# Patient Record
Sex: MALE | Race: BLACK OR AFRICAN AMERICAN | NOT HISPANIC OR LATINO | URBAN - METROPOLITAN AREA
[De-identification: names, ages, dates, MRNs, and addresses within clinical notes are randomized per-mention and may not be internally consistent; named-entity substitution may affect disease eponyms.]

---

## 2019-02-22 ENCOUNTER — OFFICE VISIT (OUTPATIENT)
Dept: RHEUMATOLOGY | Facility: CLINIC | Age: 64
End: 2019-02-22
Payer: MEDICARE

## 2019-02-22 VITALS
SYSTOLIC BLOOD PRESSURE: 140 MMHG | WEIGHT: 305 LBS | BODY MASS INDEX: 40.42 KG/M2 | TEMPERATURE: 97 F | HEART RATE: 64 BPM | HEIGHT: 73 IN | DIASTOLIC BLOOD PRESSURE: 82 MMHG | RESPIRATION RATE: 16 BRPM

## 2019-02-22 DIAGNOSIS — M46.90 INFLAMMATORY SPONDYLOPATHY, UNSPECIFIED SPINAL REGION: Primary | ICD-10-CM

## 2019-02-22 DIAGNOSIS — S83.91XD: ICD-10-CM

## 2019-02-22 DIAGNOSIS — M75.111 INCOMPLETE TEAR OF RIGHT ROTATOR CUFF: ICD-10-CM

## 2019-02-22 DIAGNOSIS — M54.16 LUMBAR RADICULOPATHY: Primary | ICD-10-CM

## 2019-02-22 DIAGNOSIS — J44.9 CHRONIC OBSTRUCTIVE PULMONARY DISEASE, UNSPECIFIED COPD TYPE: ICD-10-CM

## 2019-02-22 DIAGNOSIS — N18.4 STAGE 4 CHRONIC KIDNEY DISEASE: ICD-10-CM

## 2019-02-22 DIAGNOSIS — M51.26 HERNIATED LUMBAR INTERVERTEBRAL DISC: ICD-10-CM

## 2019-02-22 PROBLEM — R80.9 PROTEINURIA: Status: ACTIVE | Noted: 2019-02-22

## 2019-02-22 PROBLEM — M75.110 INCOMPLETE TEAR OF ROTATOR CUFF: Status: ACTIVE | Noted: 2019-02-22

## 2019-02-22 PROBLEM — M79.7 FIBROMYOSITIS: Status: ACTIVE | Noted: 2018-04-23

## 2019-02-22 PROBLEM — G56.00 CARPAL TUNNEL SYNDROME: Status: ACTIVE | Noted: 2017-04-11

## 2019-02-22 PROBLEM — I82.409 DEEP VENOUS THROMBOSIS: Status: ACTIVE | Noted: 2019-02-22

## 2019-02-22 PROBLEM — R94.4 ABNORMAL CREATININE CLEARANCE GLOMERULAR FILTRATION: Status: ACTIVE | Noted: 2019-02-22

## 2019-02-22 PROBLEM — S83.90XA: Status: ACTIVE | Noted: 2019-02-22

## 2019-02-22 PROBLEM — M75.50 SUBACROMIAL BURSITIS: Status: ACTIVE | Noted: 2019-02-22

## 2019-02-22 PROCEDURE — 99214 PR OFFICE/OUTPT VISIT, EST, LEVL IV, 30-39 MIN: ICD-10-PCS | Mod: ,,, | Performed by: INTERNAL MEDICINE

## 2019-02-22 PROCEDURE — 99214 OFFICE O/P EST MOD 30 MIN: CPT | Mod: ,,, | Performed by: INTERNAL MEDICINE

## 2019-02-22 RX ORDER — FERROUS SULFATE 325(65) MG
TABLET, DELAYED RELEASE (ENTERIC COATED) ORAL
COMMUNITY

## 2019-02-22 RX ORDER — WARFARIN 2 MG/1
TABLET ORAL
Refills: 3 | COMMUNITY
Start: 2018-12-06

## 2019-02-22 RX ORDER — BECLOMETHASONE DIPROPIONATE HFA 80 UG/1
AEROSOL, METERED RESPIRATORY (INHALATION)
COMMUNITY
Start: 2018-12-07

## 2019-02-22 RX ORDER — ALLOPURINOL 100 MG/1
TABLET ORAL
COMMUNITY
Start: 2019-01-11 | End: 2019-05-22 | Stop reason: SDUPTHER

## 2019-02-22 RX ORDER — FOLIC ACID 1 MG/1
TABLET ORAL
Refills: 3 | COMMUNITY
Start: 2018-12-07

## 2019-02-22 RX ORDER — LISINOPRIL 10 MG/1
TABLET ORAL
COMMUNITY
Start: 2019-01-11

## 2019-02-22 RX ORDER — MONTELUKAST SODIUM 10 MG/1
10 TABLET ORAL NIGHTLY
Refills: 3 | COMMUNITY
Start: 2018-12-26

## 2019-02-22 RX ORDER — TAMSULOSIN HYDROCHLORIDE 0.4 MG/1
CAPSULE ORAL
COMMUNITY
Start: 2019-01-11

## 2019-02-22 RX ORDER — HYDROCODONE BITARTRATE AND ACETAMINOPHEN 7.5; 325 MG/1; MG/1
TABLET ORAL
Refills: 0 | COMMUNITY
Start: 2019-01-14 | End: 2019-04-04 | Stop reason: SDUPTHER

## 2019-02-22 RX ORDER — HYDROCODONE BITARTRATE AND ACETAMINOPHEN 7.5; 325 MG/1; MG/1
1 TABLET ORAL 3 TIMES DAILY PRN
Qty: 90 TABLET | Refills: 0 | Status: SHIPPED | OUTPATIENT
Start: 2019-04-24 | End: 2019-05-22 | Stop reason: SDUPTHER

## 2019-02-22 RX ORDER — PREDNISONE 10 MG/1
TABLET ORAL
COMMUNITY
End: 2019-02-22 | Stop reason: SDUPTHER

## 2019-02-22 RX ORDER — WARFARIN SODIUM 5 MG/1
TABLET ORAL
COMMUNITY

## 2019-02-22 RX ORDER — CLONAZEPAM 0.5 MG/1
0.5 TABLET ORAL DAILY
Refills: 3 | COMMUNITY
Start: 2019-01-23

## 2019-02-22 RX ORDER — OMEPRAZOLE AND SODIUM BICARBONATE 40; 1100 MG/1; MG/1
CAPSULE ORAL
COMMUNITY
Start: 2018-12-27

## 2019-02-22 RX ORDER — PREDNISONE 10 MG/1
TABLET ORAL
Qty: 30 TABLET | Refills: 3 | Status: SHIPPED | OUTPATIENT
Start: 2019-02-22 | End: 2019-05-22 | Stop reason: SDUPTHER

## 2019-02-22 RX ORDER — CYCLOBENZAPRINE HCL 10 MG
TABLET ORAL
COMMUNITY
End: 2020-05-27 | Stop reason: SDUPTHER

## 2019-02-22 RX ORDER — SILDENAFIL 100 MG/1
100 TABLET, FILM COATED ORAL DAILY
Refills: 11 | COMMUNITY
Start: 2019-02-13

## 2019-02-22 RX ORDER — DICLOFENAC SODIUM 10 MG/G
GEL TOPICAL
Qty: 1 TUBE | Refills: 3 | Status: SHIPPED | OUTPATIENT
Start: 2019-02-22 | End: 2019-07-08 | Stop reason: SDUPTHER

## 2019-02-22 RX ORDER — CALCITRIOL 0.5 UG/1
0.5 CAPSULE ORAL DAILY
Refills: 3 | COMMUNITY
Start: 2018-12-22

## 2019-02-22 RX ORDER — GABAPENTIN 300 MG/1
CAPSULE ORAL
COMMUNITY
Start: 2019-01-14

## 2019-02-22 RX ORDER — DICLOFENAC SODIUM 10 MG/G
GEL TOPICAL
COMMUNITY
End: 2019-02-22 | Stop reason: SDUPTHER

## 2019-02-22 RX ORDER — FUROSEMIDE 40 MG/1
40 TABLET ORAL DAILY
Refills: 3 | COMMUNITY
Start: 2019-01-17

## 2019-02-22 RX ORDER — ALPRAZOLAM 0.5 MG/1
TABLET ORAL
COMMUNITY

## 2019-02-22 RX ORDER — LABETALOL 100 MG/1
TABLET, FILM COATED ORAL
COMMUNITY
Start: 2019-01-14

## 2019-02-22 RX ORDER — GLUCOSAM/CHONDRO/HERB 149/HYAL 750-100 MG
TABLET ORAL
COMMUNITY

## 2019-02-22 NOTE — PROGRESS NOTES
Subjective:       Patient ID: Panfilo Valadez is a 63 y.o. male.    Chief Complaint: Follow-up    HPI Main comlain  Is rt shoulder for Rotator cuff tear and is planning to have arthroscopic surgery soon. Has daily lower back sacroiliitis pain withn oocasional sciatica pain On Gabapentin 300 three tabs a day. . No acutely swollen  Tender painful joint  Except left wrist with achiness, Vontinue on allopurinol 300 mg and and nephrologist told him that kidney function is stable .         Current medications include:  Current Outpatient Medications on File Prior to Visit   Medication Sig Dispense Refill    allopurinol (ZYLOPRIM) 100 MG tablet       ALPRAZolam (XANAX) 0.5 MG tablet alprazolam 0.5 mg tablet      calcitRIOL (ROCALTROL) 0.5 MCG Cap Take 0.5 mcg by mouth once daily.  3    clonazePAM (KLONOPIN) 0.5 MG tablet Take 0.5 mg by mouth once daily.  3    cyclobenzaprine (FLEXERIL) 10 MG tablet cyclobenzaprine 10 mg tablet   TAKE 1 TABLET BY MOUTH 3 TIMES A DAY AS NEEDED FOR MUSCLE SPASMS      diclofenac sodium (VOLTAREN) 1 % Gel diclofenac 1 % topical gel      ferrous sulfate 325 (65 FE) MG EC tablet ferrous sulfate 325 mg (65 mg iron) tablet,delayed release   TAKE 1 TABLET BY MOUTH TWICE A DAY      folic acid (FOLVITE) 1 MG tablet TAKE 5 TABLETS ONE TIME DAILY  3    furosemide (LASIX) 40 MG tablet Take 40 mg by mouth once daily.  3    gabapentin (NEURONTIN) 300 MG capsule       HYDROcodone-acetaminophen (NORCO) 7.5-325 mg per tablet TAKE 1 TABLET BY MOUTH 2-3 TIMES DAILY FOR SEVERE PAIN ONLY.  0    labetalol (NORMODYNE) 100 MG tablet       lisinopril 10 MG tablet       montelukast (SINGULAIR) 10 mg tablet Take 10 mg by mouth every evening.  3    omega 3-dha-epa-fish oil (FISH OIL) 1,000 mg (120 mg-180 mg) Cap Fish Oil 1,000 mg (120 mg-180 mg) capsule   Take 2 capsules every day by oral route.      omeprazole-sodium bicarbonate (ZEGERID) 40-1.1 mg-gram per capsule       predniSONE (DELTASONE) 10 MG  "tablet prednisone 10 mg tablet   TAKE 1 TAB THREE TIMES DAILY FOR 3 DAYS, 1 TWICE DAILY X 2 DAYS, THEN 1 TAB DAILY FOR 2 DAYS.      QVAR REDIHALER 80 mcg/actuation HFAB       sildenafil (VIAGRA) 100 MG tablet Take 100 mg by mouth once daily.  11    tamsulosin (FLOMAX) 0.4 mg Cap       warfarin (COUMADIN) 2 MG tablet TAKE 1 TABLET BY MOUTH ONCE A DAY TAKE WITH COUMADIN 5 MG/DAY.  3    warfarin (COUMADIN) 5 MG tablet warfarin 5 mg tablet       No current facility-administered medications on file prior to visit.        Lab Results:  No results found for: WBC, RBC, HGB, HCT, MCV, COLORU, SPECGRAV, PHUR, WBCUR, NITRITE, GLUCOSEUR, KETONESU, BILIRUBINUR, RBCUR, NA, K, CL, CO2, GLU, BUN, CREATININE     Review of Systems   HENT:        Dryness in eyes helped by Systane at night   Respiratory: Positive for wheezing.         Asthma   Musculoskeletal: Positive for back pain and neck pain.   Neurological: Positive for numbness.         Objective:   BP (!) 140/82 (BP Location: Right arm, Patient Position: Sitting, BP Method: Large (Manual))   Pulse 64   Temp 97.3 °F (36.3 °C) (Temporal)   Resp 16   Ht 6' 1" (1.854 m)   Wt (!) 138.3 kg (305 lb)   BMI 40.24 kg/m²      Physical Exam   Constitutional: He is well-developed, well-nourished, and in no distress.   HENT:   Dryness in eyes   Pulmonary/Chest: He has wheezes.       Right Side Rheumatological Exam     The patient is tender to palpation of the shoulder and knee    He has swelling of the knee    Shoulder Exam   Tenderness Location: subacromion    Range of Motion   Active abduction: abnormal     Knee Exam   Tenderness Location: patella tendon    Range of Motion   Extension: abnormal     Muscle Strength (0-5 scale):  Neck Flexion:  4    Left Side Rheumatological Exam     The patient is tender to palpation of the wrist.    Muscle Strength (0-5 scale):  Neck Flexion:  4      Neurological:   Occasional sciatica         Assessment:       1. Inflammatory spondylopathy, " unspecified spinal region    2. Rupture of ligament of knee joint, right, subsequent encounter    3. Incomplete tear of right rotator cuff    4. Stage 4 chronic kidney disease    5. Chronic obstructive pulmonary disease, unspecified COPD type    6. Herniated lumbar intervertebral disc            Plan:         CONTINUE CURRENT MEDICINES AND DOSIS. uSE aSPERCREQAM AND LIDOCAINE FOR LEFT WRIST TENDONITIS

## 2019-04-04 ENCOUNTER — TELEPHONE (OUTPATIENT)
Dept: RHEUMATOLOGY | Facility: CLINIC | Age: 64
End: 2019-04-04

## 2019-04-04 DIAGNOSIS — M54.17 LUMBOSACRAL RADICULOPATHY: Primary | ICD-10-CM

## 2019-04-04 RX ORDER — HYDROCODONE BITARTRATE AND ACETAMINOPHEN 7.5; 325 MG/1; MG/1
1 TABLET ORAL EVERY 8 HOURS PRN
Qty: 90 TABLET | Refills: 0 | Status: SHIPPED | OUTPATIENT
Start: 2019-04-04 | End: 2019-05-22 | Stop reason: SDUPTHER

## 2019-05-15 DIAGNOSIS — Z79.899 ENCOUNTER FOR LONG-TERM (CURRENT) USE OF MEDICATIONS: Primary | ICD-10-CM

## 2019-05-17 LAB
ABS NRBC COUNT: 0 X 10 3/UL (ref 0–0.01)
ABSOLUTE BASOPHIL: 0.07 X 10 3/UL (ref 0–0.22)
ABSOLUTE EOSINOPHIL: 0.05 X 10 3/UL (ref 0.04–0.54)
ABSOLUTE IMMATURE GRAN: 0.02 X 10 3/UL (ref 0–0.04)
ABSOLUTE LYMPHOCYTE: 0.97 X 10 3/UL (ref 0.86–4.75)
ABSOLUTE MONOCYTE: 0.8 X 10 3/UL (ref 0.22–1.08)
ALBUMIN SERPL-MCNC: 3.8 G/DL (ref 3.5–5.2)
ALBUMIN/GLOB SERPL ELPH: 1.2 {RATIO} (ref 1–2.7)
ALP ISOS SERPL LEV INH-CCNC: 71 IU/L (ref 40–130)
ALT (SGPT): 17 U/L (ref 0–41)
AMORPH URATE CRY URNS QL MICRO: NEGATIVE
ANION GAP SERPL CALC-SCNC: 9 MMOL/L (ref 8–17)
AST SERPL-CCNC: 35 U/L (ref 0–40)
BACTERIA #/AREA URNS HPF: NEGATIVE /[HPF]
BASOPHILS NFR BLD: 0.9 %
BILIRUB UR QL STRIP: NEGATIVE
BILIRUBIN, TOTAL: 0.29 MG/DL (ref 0–1.2)
BUN/CREAT SERPL: 10.4 (ref 6–20)
CALCIUM SERPL-MCNC: 10.2 MG/DL (ref 8.6–10.2)
CARBON DIOXIDE, CO2: 24 MMOL/L (ref 22–29)
CHLORIDE: 106 MMOL/L (ref 98–107)
CLARITY UR: CLEAR
COLOR UR: YELLOW
CREAT SERPL-MCNC: 2.71 MG/DL (ref 0.7–1.2)
EOSINOPHIL NFR BLD: 0.6 %
EPITHELIAL CELLS: ABNORMAL
GFR ESTIMATION: 23.89
GLOBULIN: 3.2 G/DL (ref 1.5–4.5)
GLUCOSE (UA): NEGATIVE MG/DL
GLUCOSE: 114 MG/DL (ref 82–115)
HCT VFR BLD AUTO: 50.2 % (ref 42–52)
HGB BLD-MCNC: 15.8 G/DL (ref 14–18)
IMMATURE GRANULOCYTES: 0.3 % (ref 0–0.5)
KETONES UR QL STRIP: NEGATIVE MG/DL
LEUKOCYTE ESTERASE UR QL STRIP: NEGATIVE
LYMPHOCYTES NFR BLD: 12.4 %
MCH RBC QN AUTO: 28.7 PG (ref 27–32)
MCHC RBC AUTO-ENTMCNC: 31.5 G/DL (ref 32–36)
MCV RBC AUTO: 91.1 FL (ref 80–94)
MONOCYTES NFR BLD: 10.2 %
MUCOUS THREADS URNS QL MICRO: ABNORMAL
NEUTROPHILS ABSOLUTE COUNT: 5.94 X 10 3/UL (ref 2.15–7.56)
NEUTROPHILS NFR BLD: 75.6 %
NITRITE UR QL STRIP: NEGATIVE
NUCLEATED RED BLOOD CELLS: 0 /100 WBC (ref 0–0.2)
OCCULT BLOOD: NEGATIVE
PH, URINE: 6.5 (ref 5–7.5)
PLATELET # BLD AUTO: 223 X 10 3/UL (ref 135–400)
POTASSIUM: 5 MMOL/L (ref 3.5–5.1)
PROT SNV-MCNC: 7 G/DL (ref 6.4–8.3)
PROT UR QL STRIP: 30 MG/DL
RBC # BLD AUTO: 5.51 X 10 6/UL (ref 4.7–6.1)
RBC/HPF: NEGATIVE
RDW-SD: 50.3 FL (ref 37–54)
SED RATE (WESTERGREN): 28 MM/HR (ref 0–20)
SODIUM: 139 MMOL/L (ref 136–145)
SP GR UR STRIP: 1.01 (ref 1–1.03)
UREA NITROGEN (BUN): 28.1 MG/DL (ref 8–23)
UROBILINOGEN, URINE: 1 E.U./DL (ref 0–1)
WBC # BLD: 7.85 X 10 3/UL (ref 4.3–10.8)
WBC/HPF: NEGATIVE

## 2019-05-22 ENCOUNTER — OFFICE VISIT (OUTPATIENT)
Dept: RHEUMATOLOGY | Facility: CLINIC | Age: 64
End: 2019-05-22
Payer: MEDICARE

## 2019-05-22 VITALS
DIASTOLIC BLOOD PRESSURE: 80 MMHG | SYSTOLIC BLOOD PRESSURE: 120 MMHG | TEMPERATURE: 97 F | HEIGHT: 74 IN | WEIGHT: 302 LBS | RESPIRATION RATE: 16 BRPM | BODY MASS INDEX: 38.76 KG/M2 | HEART RATE: 84 BPM

## 2019-05-22 DIAGNOSIS — S83.91XD: ICD-10-CM

## 2019-05-22 DIAGNOSIS — M54.16 LUMBAR RADICULOPATHY: ICD-10-CM

## 2019-05-22 DIAGNOSIS — M46.90 INFLAMMATORY SPONDYLOPATHY, UNSPECIFIED SPINAL REGION: ICD-10-CM

## 2019-05-22 DIAGNOSIS — E79.0 HYPERURICEMIA: Primary | ICD-10-CM

## 2019-05-22 DIAGNOSIS — N18.4 STAGE 4 CHRONIC KIDNEY DISEASE: ICD-10-CM

## 2019-05-22 DIAGNOSIS — M54.30 SCIATICA, UNSPECIFIED LATERALITY: ICD-10-CM

## 2019-05-22 DIAGNOSIS — R94.4 ABNORMAL CREATININE CLEARANCE GLOMERULAR FILTRATION: ICD-10-CM

## 2019-05-22 DIAGNOSIS — M75.50 SUBACROMIAL BURSITIS: ICD-10-CM

## 2019-05-22 DIAGNOSIS — M10.9 GOUT, UNSPECIFIED CAUSE, UNSPECIFIED CHRONICITY, UNSPECIFIED SITE: ICD-10-CM

## 2019-05-22 DIAGNOSIS — J44.9 CHRONIC OBSTRUCTIVE PULMONARY DISEASE, UNSPECIFIED COPD TYPE: ICD-10-CM

## 2019-05-22 DIAGNOSIS — M79.7 FIBROMYOSITIS: ICD-10-CM

## 2019-05-22 PROBLEM — M12.811 ROTATOR CUFF TEAR ARTHROPATHY OF RIGHT SHOULDER: Status: ACTIVE | Noted: 2019-05-22

## 2019-05-22 PROBLEM — M70.60 TROCHANTERIC BURSITIS: Status: ACTIVE | Noted: 2019-05-22

## 2019-05-22 PROBLEM — D84.821 IMMUNODEFICIENCY DUE TO LONG TERM DRUG THERAPY: Status: ACTIVE | Noted: 2019-05-22

## 2019-05-22 PROBLEM — I10 HYPERTENSIVE DISORDER: Status: ACTIVE | Noted: 2019-05-22

## 2019-05-22 PROBLEM — M79.2 NEURALGIA: Status: ACTIVE | Noted: 2019-05-22

## 2019-05-22 PROBLEM — M75.101 ROTATOR CUFF TEAR ARTHROPATHY OF RIGHT SHOULDER: Status: ACTIVE | Noted: 2019-05-22

## 2019-05-22 PROBLEM — E50.7 XEROPHTHALMIA: Status: ACTIVE | Noted: 2019-05-22

## 2019-05-22 PROBLEM — Z79.899 IMMUNODEFICIENCY DUE TO LONG TERM DRUG THERAPY: Status: ACTIVE | Noted: 2019-05-22

## 2019-05-22 PROCEDURE — 99214 OFFICE O/P EST MOD 30 MIN: CPT | Mod: S$GLB,,, | Performed by: INTERNAL MEDICINE

## 2019-05-22 PROCEDURE — 99214 PR OFFICE/OUTPT VISIT, EST, LEVL IV, 30-39 MIN: ICD-10-PCS | Mod: S$GLB,,, | Performed by: INTERNAL MEDICINE

## 2019-05-22 RX ORDER — PREDNISONE 10 MG/1
TABLET ORAL
Qty: 30 TABLET | Refills: 3 | Status: SHIPPED | OUTPATIENT
Start: 2019-05-22 | End: 2019-10-30 | Stop reason: SDUPTHER

## 2019-05-22 RX ORDER — ALLOPURINOL 100 MG/1
100 TABLET ORAL 3 TIMES DAILY PRN
Qty: 90 TABLET | Refills: 4 | Status: SHIPPED | OUTPATIENT
Start: 2019-05-22 | End: 2019-06-01 | Stop reason: SDUPTHER

## 2019-05-22 RX ORDER — HYDROCODONE BITARTRATE AND ACETAMINOPHEN 7.5; 325 MG/1; MG/1
1 TABLET ORAL 3 TIMES DAILY PRN
Qty: 90 TABLET | Refills: 0 | Status: SHIPPED | OUTPATIENT
Start: 2019-05-22 | End: 2019-06-20

## 2019-05-22 NOTE — PROGRESS NOTES
Subjective:       Patient ID: Panfilo Valadez is a 63 y.o. male.    Chief Complaint: Follow-up (with labs)    HPI continue on allopurinol with no acut gouty arthritis, Left knee arthroplasty for quadricep tear repain doing well with use of a brace.Left wrist with aching on daily basis. Continue Gabapentin 200 mg BID. No recurrencem of blood clot on Coumadinn 67 mg po . Shoulder hurt more on the rt fro rotatootvsddf.     Current medications include:  Current Outpatient Medications on File Prior to Visit   Medication Sig Dispense Refill    ALPRAZolam (XANAX) 0.5 MG tablet alprazolam 0.5 mg tablet      calcitRIOL (ROCALTROL) 0.5 MCG Cap Take 0.5 mcg by mouth once daily.  3    clonazePAM (KLONOPIN) 0.5 MG tablet Take 0.5 mg by mouth once daily.  3    cyclobenzaprine (FLEXERIL) 10 MG tablet cyclobenzaprine 10 mg tablet   TAKE 1 TABLET BY MOUTH 3 TIMES A DAY AS NEEDED FOR MUSCLE SPASMS      diclofenac sodium (VOLTAREN) 1 % Gel Apply 2 gm on painful joint area QID PRN 1 Tube 3    ferrous sulfate 325 (65 FE) MG EC tablet ferrous sulfate 325 mg (65 mg iron) tablet,delayed release   TAKE 1 TABLET BY MOUTH TWICE A DAY      folic acid (FOLVITE) 1 MG tablet TAKE 5 TABLETS ONE TIME DAILY  3    furosemide (LASIX) 40 MG tablet Take 40 mg by mouth once daily.  3    gabapentin (NEURONTIN) 300 MG capsule       labetalol (NORMODYNE) 100 MG tablet       lisinopril 10 MG tablet       montelukast (SINGULAIR) 10 mg tablet Take 10 mg by mouth every evening.  3    omega 3-dha-epa-fish oil (FISH OIL) 1,000 mg (120 mg-180 mg) Cap Fish Oil 1,000 mg (120 mg-180 mg) capsule   Take 2 capsules every day by oral route.      omeprazole-sodium bicarbonate (ZEGERID) 40-1.1 mg-gram per capsule       QVAR REDIHALER 80 mcg/actuation HFAB       sildenafil (VIAGRA) 100 MG tablet Take 100 mg by mouth once daily.  11    tamsulosin (FLOMAX) 0.4 mg Cap       warfarin (COUMADIN) 2 MG tablet TAKE 1 TABLET BY MOUTH ONCE A DAY TAKE WITH COUMADIN 5  "MG/DAY.  3    warfarin (COUMADIN) 5 MG tablet warfarin 5 mg tablet      [DISCONTINUED] allopurinol (ZYLOPRIM) 100 MG tablet       [DISCONTINUED] HYDROcodone-acetaminophen (NORCO) 7.5-325 mg per tablet Take 1 tablet by mouth 3 (three) times daily as needed for Pain. 90 tablet 0    [DISCONTINUED] HYDROcodone-acetaminophen (NORCO) 7.5-325 mg per tablet Take 1 tablet by mouth every 8 (eight) hours as needed for Pain. 90 tablet 0    [DISCONTINUED] predniSONE (DELTASONE) 10 MG tablet prednisone 10 mg tablet  TAKE 1 TAB THREE TIMES DAILY FOR 3 DAYS, 1 TWICE DAILY X 2 DAYS, THEN 1 TAB DAILY FOR 2 DAYS. 30 tablet 3     No current facility-administered medications on file prior to visit.        Lab Results:  WBC   Date Value Ref Range Status   05/15/2019 7.85 4.3 - 10.8 X 10 3/ul Final     Hemoglobin   Date Value Ref Range Status   05/15/2019 15.8 14 - 18 g/dL Final     Hematocrit   Date Value Ref Range Status   05/15/2019 50.2 42 - 52 % Final     Color, UA   Date Value Ref Range Status   05/15/2019 YELLOW  Final     Ketones, UA   Date Value Ref Range Status   05/15/2019 NEGATIVE NEGATIVE mg/dL Final     Sodium   Date Value Ref Range Status   05/15/2019 139 136 - 145 mmol/L Final     Potassium   Date Value Ref Range Status   05/15/2019 5.0 3.5 - 5.1 mmol/L Final     Chloride   Date Value Ref Range Status   05/15/2019 106 98 - 107 mmol/L Final     CO2   Date Value Ref Range Status   05/15/2019 24 22 - 29 mmol/L Final     BUN, Bld   Date Value Ref Range Status   05/15/2019 28.1 (H) 8 - 23 mg/dL Final     Creatinine   Date Value Ref Range Status   05/15/2019 2.71 (H) 0.70 - 1.20 mg/dL Final     Comment:     RECOMMEND REPEAT CREATININE WITHIN 90 DAYS        Review of Systems      Objective:   /80 (BP Location: Left arm, Patient Position: Sitting, BP Method: Large (Manual))   Pulse 84   Temp 96.9 °F (36.1 °C) (Temporal)   Resp 16   Ht 6' 2" (1.88 m)   Wt (!) 137 kg (302 lb)   BMI 38.77 kg/m²      Physical Exam    "   Assessment:       1. Gout, unspecified cause, unspecified chronicity, unspecified site    2. Lumbar radiculopathy    3. Inflammatory spondylopathy, unspecified spinal region    4. Fibromyositis    5. Rupture of ligament of knee joint, right, subsequent encounter    6. Subacromial bursitis    7. Hyperuricemia    8. Sciatica, unspecified laterality    9. Stage 4 chronic kidney disease    10. Abnormal creatinine clearance glomerular filtration    11. Chronic obstructive pulmonary disease, unspecified COPD type            Plan:       comntinue current medicaions nd dosis

## 2019-05-22 NOTE — PROGRESS NOTES
Subjective:       Patient ID: Panfilo Valadez is a 63 y.o. male.    Chief Complaint: Follow-up (with labs)    HPI Already done        Current medications include:  Current Outpatient Medications on File Prior to Visit   Medication Sig Dispense Refill    ALPRAZolam (XANAX) 0.5 MG tablet alprazolam 0.5 mg tablet      calcitRIOL (ROCALTROL) 0.5 MCG Cap Take 0.5 mcg by mouth once daily.  3    clonazePAM (KLONOPIN) 0.5 MG tablet Take 0.5 mg by mouth once daily.  3    cyclobenzaprine (FLEXERIL) 10 MG tablet cyclobenzaprine 10 mg tablet   TAKE 1 TABLET BY MOUTH 3 TIMES A DAY AS NEEDED FOR MUSCLE SPASMS      diclofenac sodium (VOLTAREN) 1 % Gel Apply 2 gm on painful joint area QID PRN 1 Tube 3    ferrous sulfate 325 (65 FE) MG EC tablet ferrous sulfate 325 mg (65 mg iron) tablet,delayed release   TAKE 1 TABLET BY MOUTH TWICE A DAY      folic acid (FOLVITE) 1 MG tablet TAKE 5 TABLETS ONE TIME DAILY  3    furosemide (LASIX) 40 MG tablet Take 40 mg by mouth once daily.  3    gabapentin (NEURONTIN) 300 MG capsule       labetalol (NORMODYNE) 100 MG tablet       lisinopril 10 MG tablet       montelukast (SINGULAIR) 10 mg tablet Take 10 mg by mouth every evening.  3    omega 3-dha-epa-fish oil (FISH OIL) 1,000 mg (120 mg-180 mg) Cap Fish Oil 1,000 mg (120 mg-180 mg) capsule   Take 2 capsules every day by oral route.      omeprazole-sodium bicarbonate (ZEGERID) 40-1.1 mg-gram per capsule       QVAR REDIHALER 80 mcg/actuation HFAB       sildenafil (VIAGRA) 100 MG tablet Take 100 mg by mouth once daily.  11    tamsulosin (FLOMAX) 0.4 mg Cap       warfarin (COUMADIN) 2 MG tablet TAKE 1 TABLET BY MOUTH ONCE A DAY TAKE WITH COUMADIN 5 MG/DAY.  3    warfarin (COUMADIN) 5 MG tablet warfarin 5 mg tablet      [DISCONTINUED] allopurinol (ZYLOPRIM) 100 MG tablet       [DISCONTINUED] HYDROcodone-acetaminophen (NORCO) 7.5-325 mg per tablet Take 1 tablet by mouth 3 (three) times daily as needed for Pain. 90 tablet 0     [DISCONTINUED] HYDROcodone-acetaminophen (NORCO) 7.5-325 mg per tablet Take 1 tablet by mouth every 8 (eight) hours as needed for Pain. 90 tablet 0    [DISCONTINUED] predniSONE (DELTASONE) 10 MG tablet prednisone 10 mg tablet  TAKE 1 TAB THREE TIMES DAILY FOR 3 DAYS, 1 TWICE DAILY X 2 DAYS, THEN 1 TAB DAILY FOR 2 DAYS. 30 tablet 3     No current facility-administered medications on file prior to visit.        Lab Results:  WBC   Date Value Ref Range Status   05/15/2019 7.85 4.3 - 10.8 X 10 3/ul Final     Hemoglobin   Date Value Ref Range Status   05/15/2019 15.8 14 - 18 g/dL Final     Hematocrit   Date Value Ref Range Status   05/15/2019 50.2 42 - 52 % Final     Color, UA   Date Value Ref Range Status   05/15/2019 YELLOW  Final     Ketones, UA   Date Value Ref Range Status   05/15/2019 NEGATIVE NEGATIVE mg/dL Final     Sodium   Date Value Ref Range Status   05/15/2019 139 136 - 145 mmol/L Final     Potassium   Date Value Ref Range Status   05/15/2019 5.0 3.5 - 5.1 mmol/L Final     Chloride   Date Value Ref Range Status   05/15/2019 106 98 - 107 mmol/L Final     CO2   Date Value Ref Range Status   05/15/2019 24 22 - 29 mmol/L Final     BUN, Bld   Date Value Ref Range Status   05/15/2019 28.1 (H) 8 - 23 mg/dL Final     Creatinine   Date Value Ref Range Status   05/15/2019 2.71 (H) 0.70 - 1.20 mg/dL Final     Comment:     RECOMMEND REPEAT CREATININE WITHIN 90 DAYS        Review of Systems   Constitutional: Negative.    HENT:        Eyes drynes on systane   Eyes: Negative.    Respiratory:        Asthma on oral inhaler   Cardiovascular: Negative.    Gastrointestinal: Negative.    Endocrine: Negative.    Genitourinary: Negative.    Musculoskeletal: Positive for arthralgias and back pain.   Allergic/Immunologic: Positive for environmental allergies.   Neurological:        Left foot numb   Hematological: Negative.    Psychiatric/Behavioral: Negative.          Objective:   /80 (BP Location: Left arm, Patient Position:  "Sitting, BP Method: Large (Manual))   Pulse 84   Temp 96.9 °F (36.1 °C) (Temporal)   Resp 16   Ht 6' 2" (1.88 m)   Wt (!) 137 kg (302 lb)   BMI 38.77 kg/m²      Physical Exam   Constitutional: He is well-developed, well-nourished, and in no distress.   HENT:   Dry eyes   Eyes: Conjunctivae and EOM are normal. Pupils are equal, round, and reactive to light.   Neck: Normal range of motion. Neck supple.   Cardiovascular: Normal rate, regular rhythm and normal heart sounds.    Pulmonary/Chest: Effort normal and breath sounds normal.   Abdominal: Bowel sounds are normal.   Neurological:   SLR= negative   Skin: Skin is warm and dry.     Musculoskeletal:   Lower  SI tenderness           Assessment:       1. Hyperuricemia    2. Gout, unspecified cause, unspecified chronicity, unspecified site    3. Lumbar radiculopathy    4. Inflammatory spondylopathy, unspecified spinal region    5. Fibromyositis    6. Rupture of ligament of knee joint, right, subsequent encounter    7. Subacromial bursitis    8. Sciatica, unspecified laterality    9. Stage 4 chronic kidney disease    10. Abnormal creatinine clearance glomerular filtration    11. Chronic obstructive pulmonary disease, unspecified COPD type            Plan:        continue sme medications and dosis - see previous mnote     "

## 2019-06-01 DIAGNOSIS — M10.9 GOUT, UNSPECIFIED CAUSE, UNSPECIFIED CHRONICITY, UNSPECIFIED SITE: ICD-10-CM

## 2019-06-03 RX ORDER — ALLOPURINOL 100 MG/1
TABLET ORAL
Qty: 270 TABLET | Refills: 3 | Status: SHIPPED | OUTPATIENT
Start: 2019-06-03

## 2019-06-27 ENCOUNTER — TELEPHONE (OUTPATIENT)
Dept: RHEUMATOLOGY | Facility: CLINIC | Age: 64
End: 2019-06-27

## 2019-06-27 DIAGNOSIS — M54.17 LUMBOSACRAL RADICULOPATHY: Primary | ICD-10-CM

## 2019-06-27 RX ORDER — HYDROCODONE BITARTRATE AND ACETAMINOPHEN 7.5; 325 MG/1; MG/1
1 TABLET ORAL 3 TIMES DAILY PRN
Qty: 90 TABLET | Refills: 0 | Status: SHIPPED | OUTPATIENT
Start: 2019-06-27 | End: 2019-07-31 | Stop reason: SDUPTHER

## 2019-07-09 RX ORDER — DICLOFENAC SODIUM 10 MG/G
GEL TOPICAL
Qty: 100 G | Refills: 4 | Status: SHIPPED | OUTPATIENT
Start: 2019-07-09 | End: 2019-08-22 | Stop reason: SDUPTHER

## 2019-07-31 ENCOUNTER — TELEPHONE (OUTPATIENT)
Dept: RHEUMATOLOGY | Facility: CLINIC | Age: 64
End: 2019-07-31

## 2019-07-31 DIAGNOSIS — M54.17 LUMBOSACRAL RADICULOPATHY: ICD-10-CM

## 2019-07-31 RX ORDER — HYDROCODONE BITARTRATE AND ACETAMINOPHEN 7.5; 325 MG/1; MG/1
1 TABLET ORAL 3 TIMES DAILY PRN
Qty: 90 TABLET | Refills: 0 | Status: SHIPPED | OUTPATIENT
Start: 2019-07-31 | End: 2019-09-25 | Stop reason: SDUPTHER

## 2019-08-22 ENCOUNTER — OFFICE VISIT (OUTPATIENT)
Dept: RHEUMATOLOGY | Facility: CLINIC | Age: 64
End: 2019-08-22
Payer: MEDICARE

## 2019-08-22 VITALS
HEART RATE: 95 BPM | SYSTOLIC BLOOD PRESSURE: 120 MMHG | HEIGHT: 74 IN | WEIGHT: 310 LBS | TEMPERATURE: 97 F | DIASTOLIC BLOOD PRESSURE: 80 MMHG | BODY MASS INDEX: 39.78 KG/M2 | RESPIRATION RATE: 16 BRPM

## 2019-08-22 DIAGNOSIS — R94.4 ABNORMAL CREATININE CLEARANCE GLOMERULAR FILTRATION: Primary | ICD-10-CM

## 2019-08-22 DIAGNOSIS — S83.91XD: ICD-10-CM

## 2019-08-22 DIAGNOSIS — M46.90 INFLAMMATORY SPONDYLOPATHY, UNSPECIFIED SPINAL REGION: ICD-10-CM

## 2019-08-22 DIAGNOSIS — N18.4 STAGE 4 CHRONIC KIDNEY DISEASE: ICD-10-CM

## 2019-08-22 DIAGNOSIS — E79.0 HYPERURICEMIA: ICD-10-CM

## 2019-08-22 DIAGNOSIS — M54.17 LUMBOSACRAL RADICULOPATHY: ICD-10-CM

## 2019-08-22 DIAGNOSIS — R80.9 PROTEINURIA, UNSPECIFIED TYPE: ICD-10-CM

## 2019-08-22 DIAGNOSIS — E50.7 XEROPHTHALMIA: ICD-10-CM

## 2019-08-22 DIAGNOSIS — I10 HYPERTENSION, UNSPECIFIED TYPE: ICD-10-CM

## 2019-08-22 PROBLEM — M47.817 LUMBOSACRAL SPONDYLOSIS: Status: ACTIVE | Noted: 2019-08-22

## 2019-08-22 PROCEDURE — 99214 PR OFFICE/OUTPT VISIT, EST, LEVL IV, 30-39 MIN: ICD-10-PCS | Mod: S$GLB,,, | Performed by: INTERNAL MEDICINE

## 2019-08-22 PROCEDURE — 99214 OFFICE O/P EST MOD 30 MIN: CPT | Mod: S$GLB,,, | Performed by: INTERNAL MEDICINE

## 2019-08-22 RX ORDER — DICLOFENAC SODIUM 10 MG/G
GEL TOPICAL
Qty: 100 G | Refills: 4 | Status: SHIPPED | OUTPATIENT
Start: 2019-08-22 | End: 2020-02-26 | Stop reason: SDUPTHER

## 2019-09-25 ENCOUNTER — TELEPHONE (OUTPATIENT)
Dept: RHEUMATOLOGY | Facility: CLINIC | Age: 64
End: 2019-09-25

## 2019-09-25 DIAGNOSIS — M54.17 LUMBOSACRAL RADICULOPATHY: ICD-10-CM

## 2019-09-25 RX ORDER — HYDROCODONE BITARTRATE AND ACETAMINOPHEN 7.5; 325 MG/1; MG/1
1 TABLET ORAL 3 TIMES DAILY PRN
Qty: 90 TABLET | Refills: 0 | Status: SHIPPED | OUTPATIENT
Start: 2019-09-25 | End: 2019-11-07 | Stop reason: SDUPTHER

## 2019-10-30 DIAGNOSIS — M54.16 LUMBAR RADICULOPATHY: ICD-10-CM

## 2019-10-31 RX ORDER — PREDNISONE 10 MG/1
TABLET ORAL
Qty: 45 TABLET | Refills: 4 | Status: SHIPPED | OUTPATIENT
Start: 2019-10-31 | End: 2020-05-27 | Stop reason: SDUPTHER

## 2019-11-07 DIAGNOSIS — M54.17 LUMBOSACRAL RADICULOPATHY: ICD-10-CM

## 2019-11-08 ENCOUNTER — TELEPHONE (OUTPATIENT)
Dept: RHEUMATOLOGY | Facility: CLINIC | Age: 64
End: 2019-11-08

## 2019-11-08 RX ORDER — HYDROCODONE BITARTRATE AND ACETAMINOPHEN 7.5; 325 MG/1; MG/1
1 TABLET ORAL 3 TIMES DAILY PRN
Qty: 90 TABLET | Refills: 0 | Status: SHIPPED | OUTPATIENT
Start: 2019-11-08 | End: 2019-12-16 | Stop reason: SDUPTHER

## 2019-11-26 ENCOUNTER — OFFICE VISIT (OUTPATIENT)
Dept: RHEUMATOLOGY | Facility: CLINIC | Age: 64
End: 2019-11-26
Payer: MEDICARE

## 2019-11-26 VITALS
RESPIRATION RATE: 16 BRPM | DIASTOLIC BLOOD PRESSURE: 80 MMHG | TEMPERATURE: 97 F | WEIGHT: 309 LBS | SYSTOLIC BLOOD PRESSURE: 120 MMHG | HEIGHT: 74 IN | OXYGEN SATURATION: 97 % | HEART RATE: 84 BPM | BODY MASS INDEX: 39.66 KG/M2

## 2019-11-26 DIAGNOSIS — R94.4 ABNORMAL CREATININE CLEARANCE GLOMERULAR FILTRATION: ICD-10-CM

## 2019-11-26 DIAGNOSIS — E50.7 XEROPHTHALMIA: ICD-10-CM

## 2019-11-26 DIAGNOSIS — M47.27 OSTEOARTHRITIS OF SPINE WITH RADICULOPATHY, LUMBOSACRAL REGION: ICD-10-CM

## 2019-11-26 DIAGNOSIS — I10 HYPERTENSION, UNSPECIFIED TYPE: ICD-10-CM

## 2019-11-26 DIAGNOSIS — M46.90 INFLAMMATORY SPONDYLOPATHY, UNSPECIFIED SPINAL REGION: ICD-10-CM

## 2019-11-26 DIAGNOSIS — J44.9 CHRONIC OBSTRUCTIVE PULMONARY DISEASE, UNSPECIFIED COPD TYPE: ICD-10-CM

## 2019-11-26 DIAGNOSIS — M79.2 NEURALGIA: ICD-10-CM

## 2019-11-26 DIAGNOSIS — M54.17 LUMBOSACRAL RADICULOPATHY: ICD-10-CM

## 2019-11-26 DIAGNOSIS — E79.0 HYPERURICEMIA: Primary | ICD-10-CM

## 2019-11-26 PROCEDURE — 99214 PR OFFICE/OUTPT VISIT, EST, LEVL IV, 30-39 MIN: ICD-10-PCS | Mod: S$GLB,,, | Performed by: INTERNAL MEDICINE

## 2019-11-26 PROCEDURE — 99214 OFFICE O/P EST MOD 30 MIN: CPT | Mod: S$GLB,,, | Performed by: INTERNAL MEDICINE

## 2019-11-26 NOTE — PROGRESS NOTES
Subjective:       Patient ID: Panfilo Valadez is a 64 y.o. male.    Chief Complaint: Follow-up (with lab results)    HPI continue on allopurinol 300 mg a day with no acute gouty attacks since last visit. Hes also on Gabapentin 300 mg three day area . Has low back pain with buttock area pain. Sees Dr Pop for decreased in kidney dysfunction and has appt  Next two weeks . Takes coumadin for history of blood clot.Request Lidocaine patches. 2 weeks ago ha a gastroscopy and colonoscopy =normal        Current medications include:  Current Outpatient Medications on File Prior to Visit   Medication Sig Dispense Refill    allopurinol (ZYLOPRIM) 100 MG tablet TAKE 3 TABLETS DAILY 270 tablet 3    ALPRAZolam (XANAX) 0.5 MG tablet alprazolam 0.5 mg tablet      calcitRIOL (ROCALTROL) 0.5 MCG Cap Take 0.5 mcg by mouth once daily.  3    clonazePAM (KLONOPIN) 0.5 MG tablet Take 0.5 mg by mouth once daily.  3    cyclobenzaprine (FLEXERIL) 10 MG tablet cyclobenzaprine 10 mg tablet   TAKE 1 TABLET BY MOUTH 3 TIMES A DAY AS NEEDED FOR MUSCLE SPASMS      diclofenac sodium (VOLTAREN) 1 % Gel APPLY 1 GRAM ON PAINFUL JOINT AREA FOUR TIMES PER DAY AS NEEDED 100 g 4    ferrous sulfate 325 (65 FE) MG EC tablet ferrous sulfate 325 mg (65 mg iron) tablet,delayed release   TAKE 1 TABLET BY MOUTH TWICE A DAY      folic acid (FOLVITE) 1 MG tablet TAKE 5 TABLETS ONE TIME DAILY  3    furosemide (LASIX) 40 MG tablet Take 40 mg by mouth once daily.  3    gabapentin (NEURONTIN) 300 MG capsule       HYDROcodone-acetaminophen (NORCO) 7.5-325 mg per tablet Take 1 tablet by mouth 3 (three) times daily as needed for Pain. Greater than 7 day supply is medically necessary. 90 tablet 0    labetalol (NORMODYNE) 100 MG tablet       lisinopril 10 MG tablet       montelukast (SINGULAIR) 10 mg tablet Take 10 mg by mouth every evening.  3    omega 3-dha-epa-fish oil (FISH OIL) 1,000 mg (120 mg-180 mg) Cap Fish Oil 1,000 mg (120 mg-180 mg) capsule    Take 2 capsules every day by oral route.      omeprazole-sodium bicarbonate (ZEGERID) 40-1.1 mg-gram per capsule       predniSONE (DELTASONE) 10 MG tablet TAKE 1 TAB THREE TIMES DAILY FOR 3 DAYS, 1 TWICE DAILY X 2 DAYS, THEN 1 TAB DAILY FOR 2 DAYS. 45 tablet 4    QVAR REDIHALER 80 mcg/actuation HFAB       sildenafil (VIAGRA) 100 MG tablet Take 100 mg by mouth once daily.  11    tamsulosin (FLOMAX) 0.4 mg Cap       warfarin (COUMADIN) 2 MG tablet TAKE 1 TABLET BY MOUTH ONCE A DAY TAKE WITH COUMADIN 5 MG/DAY.  3    warfarin (COUMADIN) 5 MG tablet warfarin 5 mg tablet       No current facility-administered medications on file prior to visit.        Lab Results:  WBC   Date Value Ref Range Status   05/15/2019 7.85 4.3 - 10.8 X 10 3/ul Final     Hemoglobin   Date Value Ref Range Status   05/15/2019 15.8 14 - 18 g/dL Final     Hematocrit   Date Value Ref Range Status   05/15/2019 50.2 42 - 52 % Final     Color, UA   Date Value Ref Range Status   05/15/2019 YELLOW  Final     Ketones, UA   Date Value Ref Range Status   05/15/2019 NEGATIVE NEGATIVE mg/dL Final     Sodium   Date Value Ref Range Status   05/15/2019 139 136 - 145 mmol/L Final     Potassium   Date Value Ref Range Status   05/15/2019 5.0 3.5 - 5.1 mmol/L Final     Chloride   Date Value Ref Range Status   05/15/2019 106 98 - 107 mmol/L Final     CO2   Date Value Ref Range Status   05/15/2019 24 22 - 29 mmol/L Final     BUN, Bld   Date Value Ref Range Status   05/15/2019 28.1 (H) 8 - 23 mg/dL Final     Creatinine   Date Value Ref Range Status   05/15/2019 2.71 (H) 0.70 - 1.20 mg/dL Final     Comment:     RECOMMEND REPEAT CREATININE WITHIN 90 DAYS        Review of Systems   Constitutional: Negative.    HENT: Negative.    Eyes: Negative.    Respiratory: Positive for wheezing.         Asthma   Cardiovascular: Negative.    Gastrointestinal: Negative.    Endocrine: Negative.    Genitourinary: Negative.         Decreased glomerular filtration rat  "  Musculoskeletal: Positive for arthralgias and back pain.   Neurological: Negative.    Hematological:        Numbness in feet   Psychiatric/Behavioral: Negative.          Objective:   /80 (BP Location: Right arm, Patient Position: Sitting, BP Method: Large (Manual))   Pulse 84   Temp 96.7 °F (35.9 °C) (Temporal)   Resp 16   Ht 6' 2" (1.88 m)   Wt (!) 140.2 kg (309 lb)   SpO2 97%   BMI 39.67 kg/m²      Physical Exam   Constitutional: He is oriented to person, place, and time and well-developed, well-nourished, and in no distress.   HENT:   Head: Normocephalic and atraumatic.   no dryness in in eyes and mouh   Eyes: Conjunctivae and EOM are normal. Pupils are equal, round, and reactive to light.   Neck: Normal range of motion. Neck supple.   Cardiovascular: Normal rate, regular rhythm and normal heart sounds.    Pulmonary/Chest: Effort normal and breath sounds normal.   Abdominal: Soft. Bowel sounds are normal.   Neurological: He is alert and oriented to person, place, and time.   Skin: Skin is warm and dry.     Psychiatric: Mood, memory, affect and judgment normal.   Musculoskeletal:   No swelling tenderness or warmth in small joints in hips           Assessment:       1. Inflammatory spondylopathy, unspecified spinal region    2. Hyperuricemia    3. Abnormal creatinine clearance glomerular filtration    4. Hypertension, unspecified type    5. Xerophthalmia    6. Lumbosacral radiculopathy    7. Osteoarthritis of spine with radiculopathy, lumbosacral region    8. Neuralgia    9. Chronic obstructive pulmonary disease, unspecified COPD type            Plan:        Will continue current medications and dosis more symptomatic for the back     "

## 2019-12-16 DIAGNOSIS — M54.17 LUMBOSACRAL RADICULOPATHY: ICD-10-CM

## 2019-12-17 ENCOUNTER — TELEPHONE (OUTPATIENT)
Dept: RHEUMATOLOGY | Facility: CLINIC | Age: 64
End: 2019-12-17

## 2019-12-17 RX ORDER — HYDROCODONE BITARTRATE AND ACETAMINOPHEN 7.5; 325 MG/1; MG/1
1 TABLET ORAL 3 TIMES DAILY PRN
Qty: 90 TABLET | Refills: 0 | Status: SHIPPED | OUTPATIENT
Start: 2019-12-17 | End: 2020-02-10 | Stop reason: SDUPTHER

## 2020-02-10 DIAGNOSIS — M54.17 LUMBOSACRAL RADICULOPATHY: ICD-10-CM

## 2020-02-10 RX ORDER — HYDROCODONE BITARTRATE AND ACETAMINOPHEN 7.5; 325 MG/1; MG/1
1 TABLET ORAL 3 TIMES DAILY PRN
Qty: 90 TABLET | Refills: 0 | Status: SHIPPED | OUTPATIENT
Start: 2020-02-10 | End: 2020-03-17 | Stop reason: SDUPTHER

## 2020-02-26 ENCOUNTER — OFFICE VISIT (OUTPATIENT)
Dept: RHEUMATOLOGY | Facility: CLINIC | Age: 65
End: 2020-02-26
Payer: MEDICARE

## 2020-02-26 VITALS
OXYGEN SATURATION: 98 % | BODY MASS INDEX: 38.63 KG/M2 | HEART RATE: 78 BPM | RESPIRATION RATE: 16 BRPM | TEMPERATURE: 98 F | HEIGHT: 74 IN | SYSTOLIC BLOOD PRESSURE: 118 MMHG | DIASTOLIC BLOOD PRESSURE: 80 MMHG | WEIGHT: 301 LBS

## 2020-02-26 DIAGNOSIS — M12.811 ROTATOR CUFF TEAR ARTHROPATHY OF RIGHT SHOULDER: Primary | ICD-10-CM

## 2020-02-26 DIAGNOSIS — M75.101 ROTATOR CUFF TEAR ARTHROPATHY OF RIGHT SHOULDER: Primary | ICD-10-CM

## 2020-02-26 DIAGNOSIS — S46.011A TRAUMATIC INCOMPLETE TEAR OF RIGHT ROTATOR CUFF, INITIAL ENCOUNTER: ICD-10-CM

## 2020-02-26 DIAGNOSIS — M54.30 SCIATICA, UNSPECIFIED LATERALITY: ICD-10-CM

## 2020-02-26 DIAGNOSIS — M70.62 TROCHANTERIC BURSITIS OF BOTH HIPS: ICD-10-CM

## 2020-02-26 DIAGNOSIS — M70.61 TROCHANTERIC BURSITIS OF BOTH HIPS: ICD-10-CM

## 2020-02-26 DIAGNOSIS — M54.16 LUMBAR RADICULOPATHY: ICD-10-CM

## 2020-02-26 DIAGNOSIS — M46.90 INFLAMMATORY SPONDYLOPATHY, UNSPECIFIED SPINAL REGION: ICD-10-CM

## 2020-02-26 DIAGNOSIS — E79.0 HYPERURICEMIA: ICD-10-CM

## 2020-02-26 DIAGNOSIS — M79.7 FIBROMYOSITIS: ICD-10-CM

## 2020-02-26 DIAGNOSIS — M75.50 SUBACROMIAL BURSITIS, UNSPECIFIED LATERALITY: ICD-10-CM

## 2020-02-26 DIAGNOSIS — S83.91XD: ICD-10-CM

## 2020-02-26 PROCEDURE — 99214 PR OFFICE/OUTPT VISIT, EST, LEVL IV, 30-39 MIN: ICD-10-PCS | Mod: 25,S$GLB,, | Performed by: INTERNAL MEDICINE

## 2020-02-26 PROCEDURE — 20610 DRAIN/INJ JOINT/BURSA W/O US: CPT | Mod: LT,S$GLB,, | Performed by: INTERNAL MEDICINE

## 2020-02-26 PROCEDURE — 20610 PR DRAIN/INJECT LARGE JOINT/BURSA: ICD-10-PCS | Mod: LT,S$GLB,, | Performed by: INTERNAL MEDICINE

## 2020-02-26 PROCEDURE — 99214 OFFICE O/P EST MOD 30 MIN: CPT | Mod: 25,S$GLB,, | Performed by: INTERNAL MEDICINE

## 2020-02-26 RX ORDER — METHYLPREDNISOLONE ACETATE 40 MG/ML
80 INJECTION, SUSPENSION INTRA-ARTICULAR; INTRALESIONAL; INTRAMUSCULAR; SOFT TISSUE
Status: COMPLETED | OUTPATIENT
Start: 2020-02-26 | End: 2020-02-26

## 2020-02-26 RX ORDER — PREDNISONE 10 MG/1
TABLET ORAL
Qty: 45 TABLET | Refills: 4 | Status: CANCELLED | OUTPATIENT
Start: 2020-02-26

## 2020-02-26 RX ORDER — DICLOFENAC SODIUM 10 MG/G
GEL TOPICAL
Qty: 100 G | Refills: 4 | Status: SHIPPED | OUTPATIENT
Start: 2020-02-26 | End: 2020-07-22 | Stop reason: SDUPTHER

## 2020-02-26 RX ADMIN — METHYLPREDNISOLONE ACETATE 80 MG: 40 INJECTION, SUSPENSION INTRA-ARTICULAR; INTRALESIONAL; INTRAMUSCULAR; SOFT TISSUE at 10:02

## 2020-02-26 NOTE — PROGRESS NOTES
Subjective:       Patient ID: Panfilo Valadez is a 64 y.o. male.    Chief Complaint: Follow-up (with lab results)    HPI taking gabapentin 330 bid and help the back pain . No acutely wwollen tender joint . Left shouder most symptomatic joint  Low back not so bad Wants a refill of oltaren gel       Current medications include:  Current Outpatient Medications on File Prior to Visit   Medication Sig Dispense Refill    allopurinol (ZYLOPRIM) 100 MG tablet TAKE 3 TABLETS DAILY 270 tablet 3    ALPRAZolam (XANAX) 0.5 MG tablet alprazolam 0.5 mg tablet      calcitRIOL (ROCALTROL) 0.5 MCG Cap Take 0.5 mcg by mouth once daily.  3    clonazePAM (KLONOPIN) 0.5 MG tablet Take 0.5 mg by mouth once daily.  3    cyclobenzaprine (FLEXERIL) 10 MG tablet cyclobenzaprine 10 mg tablet   TAKE 1 TABLET BY MOUTH 3 TIMES A DAY AS NEEDED FOR MUSCLE SPASMS      diclofenac sodium (VOLTAREN) 1 % Gel APPLY 1 GRAM ON PAINFUL JOINT AREA FOUR TIMES PER DAY AS NEEDED 100 g 4    ferrous sulfate 325 (65 FE) MG EC tablet ferrous sulfate 325 mg (65 mg iron) tablet,delayed release   TAKE 1 TABLET BY MOUTH TWICE A DAY      folic acid (FOLVITE) 1 MG tablet TAKE 5 TABLETS ONE TIME DAILY  3    furosemide (LASIX) 40 MG tablet Take 40 mg by mouth once daily.  3    gabapentin (NEURONTIN) 300 MG capsule       HYDROcodone-acetaminophen (NORCO) 7.5-325 mg per tablet Take 1 tablet by mouth 3 (three) times daily as needed for Pain. Greater than 7 day supply is medically necessary. 90 tablet 0    labetalol (NORMODYNE) 100 MG tablet       lisinopril 10 MG tablet       montelukast (SINGULAIR) 10 mg tablet Take 10 mg by mouth every evening.  3    omega 3-dha-epa-fish oil (FISH OIL) 1,000 mg (120 mg-180 mg) Cap Fish Oil 1,000 mg (120 mg-180 mg) capsule   Take 2 capsules every day by oral route.      omeprazole-sodium bicarbonate (ZEGERID) 40-1.1 mg-gram per capsule       predniSONE (DELTASONE) 10 MG tablet TAKE 1 TAB THREE TIMES DAILY FOR 3 DAYS, 1 TWICE  DAILY X 2 DAYS, THEN 1 TAB DAILY FOR 2 DAYS. 45 tablet 4    QVAR REDIHALER 80 mcg/actuation HFAB       sildenafil (VIAGRA) 100 MG tablet Take 100 mg by mouth once daily.  11    tamsulosin (FLOMAX) 0.4 mg Cap       warfarin (COUMADIN) 2 MG tablet TAKE 1 TABLET BY MOUTH ONCE A DAY TAKE WITH COUMADIN 5 MG/DAY.  3    warfarin (COUMADIN) 5 MG tablet warfarin 5 mg tablet       No current facility-administered medications on file prior to visit.        Lab Results:  WBC   Date Value Ref Range Status   05/15/2019 7.85 4.3 - 10.8 X 10 3/ul Final     Hemoglobin   Date Value Ref Range Status   05/15/2019 15.8 14 - 18 g/dL Final     Hematocrit   Date Value Ref Range Status   05/15/2019 50.2 42 - 52 % Final     Color, UA   Date Value Ref Range Status   05/15/2019 YELLOW  Final     Ketones, UA   Date Value Ref Range Status   05/15/2019 NEGATIVE NEGATIVE mg/dL Final     Sodium   Date Value Ref Range Status   05/15/2019 139 136 - 145 mmol/L Final     Potassium   Date Value Ref Range Status   05/15/2019 5.0 3.5 - 5.1 mmol/L Final     Chloride   Date Value Ref Range Status   05/15/2019 106 98 - 107 mmol/L Final     CO2   Date Value Ref Range Status   05/15/2019 24 22 - 29 mmol/L Final     BUN, Bld   Date Value Ref Range Status   05/15/2019 28.1 (H) 8 - 23 mg/dL Final     Creatinine   Date Value Ref Range Status   05/15/2019 2.71 (H) 0.70 - 1.20 mg/dL Final     Comment:     RECOMMEND REPEAT CREATININE WITHIN 90 DAYS        Review of Systems   Constitutional: Negative.    HENT:        Dryness in eyes andn mouth    Eyes: Negative.    Respiratory: Negative.    Cardiovascular: Negative.    Gastrointestinal: Negative.    Endocrine: Negative.    Genitourinary: Negative.    Musculoskeletal: Positive for arthralgias and back pain.   Allergic/Immunologic: Positive for environmental allergies.   Neurological: Positive for numbness. Headaches: burning and tingling inn hands.   Hematological: Negative.    Psychiatric/Behavioral: Negative.   "        Objective:   /80 (BP Location: Left arm, Patient Position: Sitting, BP Method: Large (Automatic))   Pulse 78   Temp 97.8 °F (36.6 °C) (Tympanic)   Resp 16   Ht 6' 2" (1.88 m)   Wt (!) 136.5 kg (301 lb)   SpO2 98%   BMI 38.65 kg/m²      Physical Exam   Constitutional: He is oriented to person, place, and time and well-developed, well-nourished, and in no distress.   HENT:   Head: Normocephalic and atraumatic.   Eyes: Conjunctivae and EOM are normal. Pupils are equal, round, and reactive to light.   Neck: Normal range of motion. Neck supple.   Cardiovascular: Normal rate and regular rhythm.    Pulmonary/Chest: Effort normal and breath sounds normal.   Abdominal: Bowel sounds are normal.   Neurological: He is alert and oriented to person, place, and time.   Skin: Skin is warm and dry.     Psychiatric: Mood, memory, affect and judgment normal.   Musculoskeletal: Normal range of motion.   Left shoulder sa bursae tenderness            Assessment:       1. Inflammatory spondylopathy, unspecified spinal region    2. Lumbar radiculopathy    3. Sciatica, unspecified laterality    4. Rupture of ligament of knee joint, right, subsequent encounter    5. Rotator cuff tear arthropathy of right shoulder    6. Traumatic incomplete tear of right rotator cuff, initial encounter    7. Hyperuricemia    8. Fibromyositis    9. Trochanteric bursitis of both hips    10. Subacromial bursitis, unspecified laterality            Plan:       Will continue same medications and dosis - he got a shot of prednisone      "

## 2020-03-17 DIAGNOSIS — M54.17 LUMBOSACRAL RADICULOPATHY: ICD-10-CM

## 2020-03-18 ENCOUNTER — TELEPHONE (OUTPATIENT)
Dept: RHEUMATOLOGY | Facility: CLINIC | Age: 65
End: 2020-03-18

## 2020-03-18 RX ORDER — HYDROCODONE BITARTRATE AND ACETAMINOPHEN 7.5; 325 MG/1; MG/1
1 TABLET ORAL 3 TIMES DAILY PRN
Qty: 60 TABLET | Refills: 0 | Status: SHIPPED | OUTPATIENT
Start: 2020-03-18 | End: 2020-04-21 | Stop reason: SDUPTHER

## 2020-04-21 DIAGNOSIS — M54.17 LUMBOSACRAL RADICULOPATHY: ICD-10-CM

## 2020-04-22 RX ORDER — HYDROCODONE BITARTRATE AND ACETAMINOPHEN 7.5; 325 MG/1; MG/1
1 TABLET ORAL 3 TIMES DAILY PRN
Qty: 90 TABLET | Refills: 0 | Status: SHIPPED | OUTPATIENT
Start: 2020-04-22 | End: 2020-05-27 | Stop reason: SDUPTHER

## 2020-05-21 ENCOUNTER — TELEPHONE (OUTPATIENT)
Dept: RHEUMATOLOGY | Facility: CLINIC | Age: 65
End: 2020-05-21

## 2020-05-27 ENCOUNTER — OFFICE VISIT (OUTPATIENT)
Dept: RHEUMATOLOGY | Facility: CLINIC | Age: 65
End: 2020-05-27
Payer: MEDICARE

## 2020-05-27 VITALS
RESPIRATION RATE: 16 BRPM | DIASTOLIC BLOOD PRESSURE: 94 MMHG | HEIGHT: 74 IN | WEIGHT: 303 LBS | TEMPERATURE: 97 F | HEART RATE: 91 BPM | SYSTOLIC BLOOD PRESSURE: 140 MMHG | BODY MASS INDEX: 38.89 KG/M2

## 2020-05-27 DIAGNOSIS — S46.011A TRAUMATIC INCOMPLETE TEAR OF RIGHT ROTATOR CUFF, INITIAL ENCOUNTER: ICD-10-CM

## 2020-05-27 DIAGNOSIS — M70.61 TROCHANTERIC BURSITIS OF BOTH HIPS: ICD-10-CM

## 2020-05-27 DIAGNOSIS — M75.50 SUBACROMIAL BURSITIS, UNSPECIFIED LATERALITY: ICD-10-CM

## 2020-05-27 DIAGNOSIS — M19.90 ACUTE ARTHRITIS: Primary | ICD-10-CM

## 2020-05-27 DIAGNOSIS — M54.17 LUMBOSACRAL RADICULOPATHY: ICD-10-CM

## 2020-05-27 DIAGNOSIS — M12.811 ROTATOR CUFF TEAR ARTHROPATHY OF RIGHT SHOULDER: ICD-10-CM

## 2020-05-27 DIAGNOSIS — M54.30 SCIATICA, UNSPECIFIED LATERALITY: ICD-10-CM

## 2020-05-27 DIAGNOSIS — M70.62 TROCHANTERIC BURSITIS OF BOTH HIPS: ICD-10-CM

## 2020-05-27 DIAGNOSIS — E79.0 HYPERURICEMIA: ICD-10-CM

## 2020-05-27 DIAGNOSIS — M46.90 INFLAMMATORY SPONDYLOPATHY, UNSPECIFIED SPINAL REGION: ICD-10-CM

## 2020-05-27 DIAGNOSIS — M75.101 ROTATOR CUFF TEAR ARTHROPATHY OF RIGHT SHOULDER: ICD-10-CM

## 2020-05-27 DIAGNOSIS — M54.16 LUMBAR RADICULOPATHY: ICD-10-CM

## 2020-05-27 PROCEDURE — 99214 OFFICE O/P EST MOD 30 MIN: CPT | Mod: S$GLB,,, | Performed by: INTERNAL MEDICINE

## 2020-05-27 PROCEDURE — 99214 PR OFFICE/OUTPT VISIT, EST, LEVL IV, 30-39 MIN: ICD-10-PCS | Mod: S$GLB,,, | Performed by: INTERNAL MEDICINE

## 2020-05-27 RX ORDER — CYCLOBENZAPRINE HCL 10 MG
10 TABLET ORAL 3 TIMES DAILY PRN
Qty: 30 TABLET | Refills: 3 | Status: SHIPPED | OUTPATIENT
Start: 2020-05-27 | End: 2020-06-06

## 2020-05-27 RX ORDER — PREDNISONE 10 MG/1
TABLET ORAL
Qty: 45 TABLET | Refills: 4 | Status: SHIPPED | OUTPATIENT
Start: 2020-05-27

## 2020-05-27 RX ORDER — CYCLOBENZAPRINE HCL 10 MG
TABLET ORAL
Qty: 60 TABLET | Refills: 2 | Status: SHIPPED | OUTPATIENT
Start: 2020-05-27 | End: 2020-07-01

## 2020-05-27 RX ORDER — HYDROCODONE BITARTRATE AND ACETAMINOPHEN 7.5; 325 MG/1; MG/1
1 TABLET ORAL EVERY 6 HOURS PRN
Qty: 90 TABLET | Refills: 0 | Status: SHIPPED | OUTPATIENT
Start: 2020-05-27 | End: 2020-07-16 | Stop reason: SDUPTHER

## 2020-05-27 NOTE — PROGRESS NOTES
Subjective:       Patient ID: Panfilo Valadez is a 64 y.o. male.    Chief Complaint: Follow-up    HPI Contionue on allopurinol with no acute gouty arthritis and no hx of kidney stones . Has lower back pain with sciatica pain. No acutely swollen tender inflammed painfuil joint.Rt hip grater trochanter  Bursitis and low back pain witjh with trochanteric bursitis and shoulder rotator cuff tendonitis        Current medications include:  Current Outpatient Medications on File Prior to Visit   Medication Sig Dispense Refill    allopurinol (ZYLOPRIM) 100 MG tablet TAKE 3 TABLETS DAILY 270 tablet 3    ALPRAZolam (XANAX) 0.5 MG tablet alprazolam 0.5 mg tablet      calcitRIOL (ROCALTROL) 0.5 MCG Cap Take 0.5 mcg by mouth once daily.  3    clonazePAM (KLONOPIN) 0.5 MG tablet Take 0.5 mg by mouth once daily.  3    diclofenac sodium (VOLTAREN) 1 % Gel APPLY 1 GRAM ON PAINFUL JOINT AREA FOUR TIMES PER DAY AS NEEDED 100 g 4    ferrous sulfate 325 (65 FE) MG EC tablet ferrous sulfate 325 mg (65 mg iron) tablet,delayed release   TAKE 1 TABLET BY MOUTH TWICE A DAY      folic acid (FOLVITE) 1 MG tablet TAKE 5 TABLETS ONE TIME DAILY  3    furosemide (LASIX) 40 MG tablet Take 40 mg by mouth once daily.  3    gabapentin (NEURONTIN) 300 MG capsule       labetalol (NORMODYNE) 100 MG tablet       lisinopril 10 MG tablet       montelukast (SINGULAIR) 10 mg tablet Take 10 mg by mouth every evening.  3    omega 3-dha-epa-fish oil (FISH OIL) 1,000 mg (120 mg-180 mg) Cap Fish Oil 1,000 mg (120 mg-180 mg) capsule   Take 2 capsules every day by oral route.      omeprazole-sodium bicarbonate (ZEGERID) 40-1.1 mg-gram per capsule       QVAR REDIHALER 80 mcg/actuation HFAB       sildenafil (VIAGRA) 100 MG tablet Take 100 mg by mouth once daily.  11    tamsulosin (FLOMAX) 0.4 mg Cap       warfarin (COUMADIN) 2 MG tablet TAKE 1 TABLET BY MOUTH ONCE A DAY TAKE WITH COUMADIN 5 MG/DAY.  3    warfarin (COUMADIN) 5 MG tablet warfarin 5 mg  tablet      [DISCONTINUED] cyclobenzaprine (FLEXERIL) 10 MG tablet cyclobenzaprine 10 mg tablet   TAKE 1 TABLET BY MOUTH 3 TIMES A DAY AS NEEDED FOR MUSCLE SPASMS      [DISCONTINUED] HYDROcodone-acetaminophen (NORCO) 7.5-325 mg per tablet Take 1 tablet by mouth 3 (three) times daily as needed for Pain. Greater than 7 day supply is medically necessary. 90 tablet 0    [DISCONTINUED] predniSONE (DELTASONE) 10 MG tablet TAKE 1 TAB THREE TIMES DAILY FOR 3 DAYS, 1 TWICE DAILY X 2 DAYS, THEN 1 TAB DAILY FOR 2 DAYS. 45 tablet 4     No current facility-administered medications on file prior to visit.        Lab Results:  WBC   Date Value Ref Range Status   05/15/2019 7.85 4.3 - 10.8 X 10 3/ul Final     Hemoglobin   Date Value Ref Range Status   05/15/2019 15.8 14 - 18 g/dL Final     Hematocrit   Date Value Ref Range Status   05/15/2019 50.2 42 - 52 % Final     Color, UA   Date Value Ref Range Status   05/15/2019 YELLOW  Final     Ketones, UA   Date Value Ref Range Status   05/15/2019 NEGATIVE NEGATIVE mg/dL Final     Sodium   Date Value Ref Range Status   05/15/2019 139 136 - 145 mmol/L Final     Potassium   Date Value Ref Range Status   05/15/2019 5.0 3.5 - 5.1 mmol/L Final     Chloride   Date Value Ref Range Status   05/15/2019 106 98 - 107 mmol/L Final     CO2   Date Value Ref Range Status   05/15/2019 24 22 - 29 mmol/L Final     BUN, Bld   Date Value Ref Range Status   05/15/2019 28.1 (H) 8 - 23 mg/dL Final     Creatinine   Date Value Ref Range Status   05/15/2019 2.71 (H) 0.70 - 1.20 mg/dL Final     Comment:     RECOMMEND REPEAT CREATININE WITHIN 90 DAYS        Review of Systems   Constitutional: Negative.    HENT:        Dryness on eyes on tear drops   Respiratory: Negative.    Cardiovascular: Negative.    Gastrointestinal: Positive for abdominal pain.   Endocrine: Negative.    Genitourinary: Negative.    Musculoskeletal: Positive for arthralgias and back pain.   Allergic/Immunologic: Positive for environmental  "allergies.   Neurological: Positive for numbness.        And tingling in legs   Hematological: Bruises/bleeds easily.   Psychiatric/Behavioral: Negative.          Objective:   BP (!) 140/94 (BP Location: Left arm, Patient Position: Sitting, BP Method: Large (Automatic))   Pulse 91   Temp 96.7 °F (35.9 °C) (Temporal)   Resp 16   Ht 6' 2" (1.88 m)   Wt (!) 137.4 kg (303 lb)   BMI 38.90 kg/m²      Physical Exam   Constitutional: He is oriented to person, place, and time.   HENT:   Head: Normocephalic and atraumatic.   No drynewss in eyes  andn mouth   Eyes: Conjunctivae and EOM are normal. Pupils are equal, round, and reactive to light.   Neck: Normal range of motion. Neck supple.   Cardiovascular: Normal rate and normal heart sounds.    Pulmonary/Chest: Effort normal and breath sounds normal.   Abdominal: Soft. Bowel sounds are normal.   Neurological: He is alert and oriented to person, place, and time. Gait normal.   Skin: Skin is warm and dry.     Psychiatric: Mood, memory, affect and judgment normal.   Musculoskeletal: Normal range of motion.   No synovial tjhickening or tenderness            Assessment:       1. Acute arthritis    2. Lumbar radiculopathy    3. Lumbosacral radiculopathy    4. Inflammatory spondylopathy, unspecified spinal region    5. Traumatic incomplete tear of right rotator cuff, initial encounter    6. Subacromial bursitis, unspecified laterality    7. Hyperuricemia    8. Trochanteric bursitis of both hips    9. Sciatica, unspecified laterality    10. Rotator cuff tear arthropathy of right shoulder            Plan:       willcontinue current medicine and dosis the same     "

## 2020-06-15 ENCOUNTER — TELEPHONE (OUTPATIENT)
Dept: RHEUMATOLOGY | Facility: CLINIC | Age: 65
End: 2020-06-15

## 2020-06-15 ENCOUNTER — OFFICE VISIT (OUTPATIENT)
Dept: RHEUMATOLOGY | Facility: CLINIC | Age: 65
End: 2020-06-15
Payer: MEDICARE

## 2020-06-15 VITALS
HEART RATE: 97 BPM | RESPIRATION RATE: 18 BRPM | TEMPERATURE: 96 F | SYSTOLIC BLOOD PRESSURE: 119 MMHG | BODY MASS INDEX: 38.63 KG/M2 | DIASTOLIC BLOOD PRESSURE: 81 MMHG | WEIGHT: 301 LBS | HEIGHT: 74 IN

## 2020-06-15 DIAGNOSIS — M12.811 ROTATOR CUFF TEAR ARTHROPATHY OF RIGHT SHOULDER: ICD-10-CM

## 2020-06-15 DIAGNOSIS — E79.0 HYPERURICEMIA: ICD-10-CM

## 2020-06-15 DIAGNOSIS — M51.26 HERNIATED LUMBAR INTERVERTEBRAL DISC: ICD-10-CM

## 2020-06-15 DIAGNOSIS — M46.90 INFLAMMATORY SPONDYLOPATHY, UNSPECIFIED SPINAL REGION: ICD-10-CM

## 2020-06-15 DIAGNOSIS — M79.10 TRIGGER POINT: Primary | ICD-10-CM

## 2020-06-15 DIAGNOSIS — M54.17 LUMBOSACRAL RADICULOPATHY: ICD-10-CM

## 2020-06-15 DIAGNOSIS — M47.27 OSTEOARTHRITIS OF SPINE WITH RADICULOPATHY, LUMBOSACRAL REGION: ICD-10-CM

## 2020-06-15 DIAGNOSIS — M75.101 ROTATOR CUFF TEAR ARTHROPATHY OF RIGHT SHOULDER: ICD-10-CM

## 2020-06-15 DIAGNOSIS — M54.30 SCIATICA, UNSPECIFIED LATERALITY: ICD-10-CM

## 2020-06-15 PROCEDURE — 99214 OFFICE O/P EST MOD 30 MIN: CPT | Mod: S$GLB,,, | Performed by: INTERNAL MEDICINE

## 2020-06-15 PROCEDURE — 99214 PR OFFICE/OUTPT VISIT, EST, LEVL IV, 30-39 MIN: ICD-10-PCS | Mod: S$GLB,,, | Performed by: INTERNAL MEDICINE

## 2020-06-15 RX ORDER — METHYLPREDNISOLONE ACETATE 40 MG/ML
80 INJECTION, SUSPENSION INTRA-ARTICULAR; INTRALESIONAL; INTRAMUSCULAR; SOFT TISSUE
Status: SHIPPED | OUTPATIENT
Start: 2020-06-15

## 2020-06-15 NOTE — PROGRESS NOTES
"Subjective:       Patient ID: Panfilo Valadez is a 64 y.o. male.    Chief Complaint: Pain (back, injection)    HPI  Has lower back erich olnset one day ago . Has back saciatica nno trsuma to the lower backl and nmo tingling  Or burning sensation to legs. o trauma  Review of Systems   Constitutional: Negative.    HENT:        Dry eyes and dry mouth    Eyes: Negative.    Respiratory: Negative.    Cardiovascular: Negative.    Gastrointestinal: Negative.    Endocrine: Negative.    Genitourinary: Negative.    Allergic/Immunologic: Positive for environmental allergies.   Neurological: Negative.    Hematological: Negative.    Psychiatric/Behavioral: Negative.          Objective:   /81 (BP Location: Left arm, Patient Position: Sitting, BP Method: Large (Automatic))   Pulse 97   Temp 96.4 °F (35.8 °C) (Temporal)   Resp 18   Ht 6' 2" (1.88 m)   Wt (!) 136.5 kg (301 lb)   BMI 38.65 kg/m²      Physical Exam   Constitutional: He is oriented to person, place, and time.   HENT:   Head: Normocephalic and atraumatic.   Right Ear: External ear normal.   Eyes: Conjunctivae and EOM are normal. Pupils are equal, round, and reactive to light.   Neck: Normal range of motion. Neck supple.   Cardiovascular: Normal rate, regular rhythm and normal heart sounds.    Pulmonary/Chest: Breath sounds normal.   Abdominal: Soft.   Neurological: He is alert and oriented to person, place, and time. Gait normal.   Skin: Skin is warm and dry.     Psychiatric: Mood, memory, affect and judgment normal.   Musculoskeletal: Normal range of motion.      Comments: Rt quadratus lumborus          No data to display     Assessment:       1. Inflammatory spondylopathy, unspecified spinal region    2. Hyperuricemia    3. Sciatica, unspecified laterality    4. Rotator cuff tear arthropathy of right shoulder    5. Lumbosacral radiculopathy    6. Osteoarthritis of spine with radiculopathy, lumbosacral region    7. Herniated lumbar intervertebral disc      "       Plan:       Problem List Items Addressed This Visit        Neuro    Herniated lumbar intervertebral disc    Lumbosacral spondylosis    Lumbosacral radiculopathy       Orthopedic    Spondylitis - Primary    Hyperuricemia    Sciatica    Rotator cuff tear arthropathy of right shoulder         will inject lower back with Depomedrol 80

## 2020-06-16 RX ORDER — METHYLPREDNISOLONE ACETATE 40 MG/ML
80 INJECTION, SUSPENSION INTRA-ARTICULAR; INTRALESIONAL; INTRAMUSCULAR; SOFT TISSUE
Status: SHIPPED | OUTPATIENT
Start: 2020-06-16

## 2020-06-19 ENCOUNTER — TELEPHONE (OUTPATIENT)
Dept: RHEUMATOLOGY | Facility: CLINIC | Age: 65
End: 2020-06-19

## 2020-07-07 DIAGNOSIS — M54.17 LUMBOSACRAL RADICULOPATHY: ICD-10-CM

## 2020-07-07 DIAGNOSIS — M19.90 ACUTE ARTHRITIS: ICD-10-CM

## 2020-07-16 DIAGNOSIS — M54.17 LUMBOSACRAL RADICULOPATHY: ICD-10-CM

## 2020-07-16 DIAGNOSIS — M19.90 ACUTE ARTHRITIS: ICD-10-CM

## 2020-07-16 RX ORDER — HYDROCODONE BITARTRATE AND ACETAMINOPHEN 7.5; 325 MG/1; MG/1
1 TABLET ORAL EVERY 6 HOURS PRN
Qty: 90 TABLET | Refills: 0 | Status: SHIPPED | OUTPATIENT
Start: 2020-07-16

## 2020-07-16 RX ORDER — HYDROCODONE BITARTRATE AND ACETAMINOPHEN 7.5; 325 MG/1; MG/1
1 TABLET ORAL 3 TIMES DAILY PRN
Qty: 90 TABLET | Refills: 0 | OUTPATIENT
Start: 2020-07-16

## 2020-07-22 DIAGNOSIS — M47.27 OSTEOARTHRITIS OF SPINE WITH RADICULOPATHY, LUMBOSACRAL REGION: Primary | ICD-10-CM

## 2020-07-22 RX ORDER — DICLOFENAC SODIUM 10 MG/G
GEL TOPICAL
Qty: 100 G | Refills: 1 | Status: SHIPPED | OUTPATIENT
Start: 2020-07-22